# Patient Record
(demographics unavailable — no encounter records)

---

## 2025-01-08 NOTE — IMAGING
[de-identified] : On examination, patient has tenderness to ovation over the lower back on about the midline and over the lumbar paraspinal muscles, tenderness over the piriformis muscles. Patient has some discomfort when palpating over the sciatic notch. Patient has good range of motion to the right and left lower extremity. Patient has mild decreased motor strength to the right lower extremity when compared to the left.  Patient is able to do active straight leg raise to legs bilaterally. Patient has positive straight leg raise to the left and contralateral straight leg raise to the right. Negative logrolling. Antalgic gait.  Radiographs of the lumbar spine, 4 views were taken, negative for any acute fracture or dislocation, hardware appears to be in good position. X-ray of the left hip and pelvis, negative for any acute fracture or dislocation, 2 views were taken

## 2025-01-08 NOTE — DISCUSSION/SUMMARY
[de-identified] : Impression: Left hip pain possibly due to lumbar radiculopathy. Lumbar strain and lumbar radiculopathy.  Plan: We discussed medication, Medrol Dosepak, tramadol for breakthrough pain and muscle relaxant was sent to the pharmacy. Patient was advised to follow-up with pain management  Follow-up: As needed in our practice, patient was referred for pain management 2 weeks.

## 2025-01-08 NOTE — HISTORY OF PRESENT ILLNESS
[de-identified] : 66-year-old female here for an evaluation of injury sustained to the left lower extremity and lower back, patient stated she fell down in about December 30, 2024, patient states that she lost balance and she fell backwards flat on her back, since then she has been having significant pain on her back, left hip and the pain radiates down to her ankle. Patient has a history of lumbar surgery and ORIF to the right hip  Current medications are Eliquis, atorvastatin, sertraline, zinc, iron, baby aspirin.

## 2025-01-22 NOTE — ADDENDUM
[FreeTextEntry1] : Ephraim Mistry MD, [place]  of Urology, [facility] [employer] Director, [facility] [employer] [address] [place] [address], [address], [place], [place]  Phone: [phone] Fax: [phone]  	 Department of Urology [facility] [employer]  Consultation  VISIT DATE: 2025  REFFERING PROVIDER: n/a  PRIMARY CARE PROVIDER: Dr. Bernardo Fonseca  PROBLEM LIST:  CHIEF COMPLAINT: right ureteral stone  HISTORY OF PRESENT ILLNESS: The patient is a 66-year-old female with past medical history of CAD, PVD, carotid artery atherosclerosis (95% on right), multiple back surgeries, DVT (w/ IVC filter and still on Eliquis), duodenal switch  c/b SBO and SBR with end ileostomy in  and subsequent ex lap and repair of enterocutaneous fistula, vulvar cancer s/p resection 2/15/2023, HTN, and HLD presented to the ED on 2025 with AMS, EMILY, slurred speech and worsening gait, and was found to have a 8.5 x 7.1 x 14.1 mm proximal right ureteral stone now s/p right ureteral stent by Dr. Hart on 2025. She presents for follow up.  The patient reports that she has no symptoms. She denies flank pain, hematuria, dysuria, fevers. With her son that provided most of the medical history. With stroke had short term memory loss.  First stone identification:   Number of stone episodes: one Interventions: stent 2025 Family history of stone disease: no Fluid intake: 1 liter Salt intake: low Dark soda intake: none Animal protein intake: one of three Citrus intake: no Vitamin C intake: yes, unsure of the dose Oxalate intake: no common foods Overweight: yes  I had a lengthy discussion with this patient regarding stone preventive measures that are conservative in nature.  We talked about drinking roughly 3-4 L per day in order to make >2.5L of urine per day, limited salt intake to <2000mg sodium per day, minimizing phosphoric acid containing sodas (diamond), increasing citric acid containing fruits or beverages, minimizing animal protein < 2meals per day and less than 6-8 ounces, limiting the intake of high oxalate foods, < 1000 mg Vitamin C, normal calcium intake and moderate exercise and weight reduction.  The patient is overweight (BMI 26). I had a 15-minute discussion with the patient regarding dietary measures to lose weight. We talked about intermittent fasting, which involves limiting intake to 400 calories twice per week or skipping breakfast indefinitely. Alternatively, the patient can ensure a caloric deficit by estimating their metabolism and count calories with tools such as an Apple watch and a smartphone application that might have the additional benefit of tracking macronutrients to ensure a balanced diet.   PAST MEDICAL HISTORY: Per HPI  PAST SURGICAL HISTORY: Per HPI Right hip replacement  MEDICATIONS: Advair diskus ASA81 Atorvastatin Calcium 500 Eliquis Fe Lorazepam  Pregabalin Proventil Sertraline Valsartan VitB12 VitD3 VitC Zinc Pantoprazole   ALLERGIES: Morphine (itch)  FAMILY HISTORY: The patient has no family history of stone disease.  SOCIAL HISTORY: Previous smoker (20 years, 1 pack per day), no alcohol or drug use  REVIEW OF SYSTEMS: 12-point review of system was conducted including: systemic review, skin, head-eyes-ears-nose-throat, respiratory, cardiovascular, gastrointestinal, endocrine, neck, genitourinary, musculoskeletal, neuro-psychiatric, and hematologic. The review of systems was negative except as per HPI.  PHYSICAL EXAMINATION: Vital Signs: (see above)  BMI: 26 General: Awake, alert, well appearing, and in no acute distress. HEENT: Normocephalic, atraumatic. Lungs: Normal respiratory effort, bilateral chest rise. Heart: Regular rate and rhythm. Abdomen: Soft, nontender, and nondistended. : No costovertebral angle tenderness bilaterally  Extremities: warm and dry.  LABORATORY: 1/15/2025 WBC: 10.34 Hgb: 9.8 Plt: 166 K: 3.7 Cr: 2.5 (down from 3.1, BL unknown) Ca: 8.0   Urine Analysis: 1/10/2025: neg for infection, pH 5.5  Urine Culture and Sensitivities: 2025: no growth  IMAGIN2025 CTAP: IMPRESSION: Right-sided hydronephrosis/hydroureter with delayed nephrogram (contrast material administered on prior day). Secondary to approximately 9 x 8 x 13 mm calculus within proximal right ureter (4/79, ) Artifact from spinal hardware.  Dr. Mistry interpretation: Image evaluation for stone disease Imaging type and date: 2025 CTAP Left ureter: clear Left kidney: -	Upper pole: clear -	Interpole: clear -	Lower pole: clear  Right ureter: 8.5 x 7.1 x 14.1 mm (HU 1214), proximal ureter Right kidney: -	Upper pole: clear -	Interpole: clear -	Lower pole: clear  Skin to stone distance: -	Posterior: 11.2 cm -	45 de.0 cm -	Lateral: 14.9 -	Average: > 10 cm  Stent Length: 24 cm Additional notes: stone too dense and skin to stone too far for ESWL  ASSESSMENT:  The patient is a 66-year-old female with past medical history of CAD, PVD, carotid artery atherosclerosis, multiple back surgeries, DVT (w/ IVC filter and still on Eliquis), duodenal switch  c/b SBO and SBR with end ileostomy in  and subsequent ex lap and repair of enterocutaneous fistula, vulvar cancer s/p resection 2/15/2023, HTN, and HLD presented to the ED on 2025 with AMS, EMILY, slurred speech and worsening gait, and was found to have a 8.5 x 7.1 x 14.1 mm proximal right ureteral stone now s/p right ureteral stent by Dr. Hart on 2025. She presents for follow up.  I carefully reviewed the nature of the patient's stone disease in detail.  We reviewed the size and location of the stone burden as well as the relevant local anatomy.  We reviewed the different options for stone management in detail including active surveillance with and without metabolic evaluation, shockwave lithotripsy, ureteroscopy (URS) with laser lithotripsy and percutaneous nephrolithotomy.  After the lengthy discussion of all the risks and the benefits of the procedures, they wish to proceed with a URS.  I carefully reviewed all the risks of URS with the patient which include the risk of  injury to the ureter  with possible development of blockage later (ureteric stricture), ureteral injury requiring longer-term stenting, ureteral avulsion, failure be able to gain access and possible placement of a potential nephrostomy tube or just leaving a ureteral stent, residual stones which are quite common,  urinary tract infection, urinary sepsis which can be fatal, a risk of anesthetic (MI/stroke/death).  We reviewed that often we leave a ureteral stent after the procedure which can be quite uncomfortable for some patients (stent colic), as well as the critical need for stent removal and the risk of encrustation which can result in additional procedures or damage to the kidney.    We reviewed possible rare systemic events such as pulmonary embolism, DVT, bleeding requiring transfusion and possible radical nephrectomy.  After lengthy discussion of all the risks they wish to proceed, and we will schedule the procedure after a standard pre-operative evaluation including a sterile urine culture.    PLAN: -	I counseled the patient on diet and lifestyle modifications for general stone disease prevention  -	OR for right ureteroscopy, laser lithotripsy, and stent exchange with possible CVAC aspiration ureteroscopy -	UA and urine culture  -	Medical clearance -  -	Cardiology clearance -  -	If the patient is on anti-coagulation medication, it does NOT have to be held -	schedule post op stent removal one week following the procedure -	post operative medications ordered (antibiotic, tamsulosin, non-opioid pain medication)   For billing purposes  High level medical decision-making visit (CPT code 95978 or 88961) based on (two of three elements): -	I personally spent 45 minutes on the total care of this patient  Longitudinal care (CPT code ): -	The patient is diagnosed with stone disease, which is a chronic condition due to lifestyle choices or metabolic dysregulation, and requires longitudinal care to prevent disease progression, colic episodes, and complications.   Preventative medicine counseling (CPT code 65094): -	Lifestyle modifications and conservative management can be used to prevent future stone disease or augment medical therapies in the stone forming patient. Counseling of at least 15 minutes has been documented in this note.

## 2025-01-23 NOTE — HISTORY OF PRESENT ILLNESS
[FreeTextEntry1] : ORIGINAL PRESENTATION: This is a 66-year-old female who has chronic pain complaints regarding the lumbosacral region noted, which is the site of an L3-L4 and L4-L5 fusion, performed in 2009. Initially after surgery, she had done very well with 100% improvement until she sustained an injury in 2014 when she fell hurting her right hip. This aggravated her lower lumbar pain features. Surgery to the hip was done. She has recovered fairly well from this. In 2015, she developed an acute left-sided lumbar radicular pain feature as well as radiating pain into the right lower extremity, which has persisted since.   PATIENT PRESENTS FOR FOLLOW UP: This is a former Dr. Neal patient transferring her care to me. She was last seen in the office on 2/20/23 for complaints of lower back pain with radicular features into the lower extremity. She returns after a long delay for thoracic pain that was exacerbated by a recent fall. The pain currently radiates into the left lower extremity. It travels down the leg to the back of the knee. She is a s/p L3-L4 and L4-L5 fusion, performed in 2009. She denies any lower back pain today. I advised that imaging of the thoracic spine is needed for further review.   Of note, she was recently in the hospital for a kidney stone and had a stent placed. She suffered a stroke in 2023 which impacted her memory.

## 2025-01-23 NOTE — DATA REVIEWED
[FreeTextEntry1] : X-rays of the thoracic spine obtained in the office today were reviewed.  There is demonstration of postoperative changes and fusion from approximately T10-S1.  There is evidence of thoracic spondylosis.  MRI of the lumbar spine dated 2/18/2020 demonstrates Postoperative changes throughout the thoracolumbar spine.  There is substantial metallic susceptibility artifact obscuring assessment to a significant degree.  There is no evidence of high-grade central stenosis.  There is diffuse mild to moderate central canal narrowing secondary to bulge.  The neural foramina also are substantially obscured by artifact, there appears to be a moderate multilevel foraminal stenosis more pronounced towards the inferior levels because of bulge and facet hypertrophy.  Given the degree of artifact which is present on MRI, CT could prove beneficial for a further assessment.

## 2025-01-23 NOTE — ASSESSMENT
[FreeTextEntry1] : This is a 66-year-old female with complaints of thoracic pain. The pain started about a month ago after she took a fall. The pain travels into the left lower extremity. It goes down to the behind the knee. She denies any lower back pain at this time. We will obtain an x-ray of the thoracic spine for further review. In the interim, I will provide her with a prescription for physical therapy for symptom control. She will follow up in 3 weeks for reassessment. All this patients questions were answered and the conversation was understood well.  Thoracic x-ay was ordered to delineate spine pathology.  Physical therapy ofthe thoracic spine2-3 times a week for 4-8 weeks stressing a home exercise program of walking, shoulder griddle strengthening, swimming, elliptical , recumbent bike, Rajan chi and Yoga. Use things that heat like hot shower or icy heat before rehab, exercising and at the beginning of the day, and ice (ice in a bag never directly on the skin) after activity and at the end of the day.  Entered by Rox Long, acting as scribe for Dr. Varghese.  Documentation recorded by the scribe, in my presence, accurately reflects the service I personally performed, and the decisions made by me with my edits as appropriate.     Thank you for allowing me to assist in the management of this patient.     Best Regards,     Michelle Varghese M.D., FAAPMR     Diplomate, American Board of Physical Medicine and Rehabilitation Diplomate, American Board of Pain Medicine

## 2025-01-23 NOTE — PHYSICAL EXAM
[Normal Coordination] : normal coordination [Normal DTR UE/LE] : normal DTR UE/LE  [Normal Sensation] : normal sensation [Normal Mood and Affect] : normal mood and affect [Oriented] : oriented [Able to Communicate] : able to communicate [Well Developed] : well developed [Well Nourished] : well nourished [] : patient ambulates without assistive device

## 2025-02-13 NOTE — PHYSICAL EXAM
[Well Developed] : well developed [Well Nourished] : well nourished [No Acute Distress] : no acute distress [Normal Conjunctiva] : normal conjunctiva [Normal Venous Pressure] : normal venous pressure [Normal S1, S2] : normal S1, S2 [No Murmur] : no murmur [No Rub] : no rub [No Gallop] : no gallop [Clear Lung Fields] : clear lung fields [Good Air Entry] : good air entry [No Respiratory Distress] : no respiratory distress  [Soft] : abdomen soft [Non Tender] : non-tender [No Masses/organomegaly] : no masses/organomegaly [Normal Bowel Sounds] : normal bowel sounds [Normal Gait] : normal gait [No Edema] : no edema [No Cyanosis] : no cyanosis [No Clubbing] : no clubbing [No Varicosities] : no varicosities [No Rash] : no rash [No Skin Lesions] : no skin lesions [Moves all extremities] : moves all extremities [No Focal Deficits] : no focal deficits [Normal Speech] : normal speech [Alert and Oriented] : alert and oriented [Normal memory] : normal memory [de-identified] : L carotid bruit

## 2025-02-13 NOTE — ASSESSMENT
[FreeTextEntry1] : Ms. Sims is a 66-year-old woman with history of CAD, CVA x2 (2021), carotid artery disease (occluded R ICA, moderate L ICA), DVT s/p IVC filter, and ileostomy presenting for follow up.  Impression: (1) CAD, s/p elective PCI of the mLAD with residual distal LCx and OM2 disease, asymptomatic, stable (2) Carotid artery disease, occluded R ICA and moderate L ICA disease 2022, has been seen by Dr. Banda (3) CVA in 2021, thought 2/2 (2) (4) DVT after CVA, s/p IVC filter, per patient was 2/2 inactivity  Plan: - Discussed all of her prior cardiac workup in detail. She denies any active anginal complaints and she is able to achieve >4 METS without effort limiting dyspnea or chest discomfort. Her last TTE from 2022 showed normal systolic function with no significant valvular disease. She is considered increased risk for surgery based on an RCRI of 2 (10% risk of MACE). This may be underestimated due to her known moderate L ICA disease. She does not require further testing or intervention prior to  surgery. - Advised that she follow up with Dr. Banda for further management of her ICA disease. A carotid doppler was ordered. - Advised she continue long term follow up with Dr. Marshall. She will schedule a visit with him for follow up after surgery. - Continue aspirin without interruption. May stop apixaban 2-3 days prior to surgery. Advised she follow up with vascular and/or hematology to determine when apixaban may be discontinued. From a strictly coronary/carotid perspective she only needs to be on aspirin. - Repeat labs

## 2025-02-13 NOTE — HISTORY OF PRESENT ILLNESS
[FreeTextEntry1] : Ms. Sims is a 66-year-old woman with history of CAD, CVA x2 (2021), carotid artery disease (occluded R ICA, moderate L ICA), DVT s/p IVC filter, and ileostomy presenting for follow up.  She sees Dr. Marshall and was last seen in office 2 years ago. She presents today for preoperative risk assessment prior to kidney stone surgery. She could not remember the name of her cardiologist and was scheduled to see me in the interim.  Overall feels well - denies any exertional chest discomfort or dyspnea. Climbs >2 flights of stairs several times a day without effort limiting dyspnea or chest pain.  She has not seen vascular surgery or her cardiologist in 2 years.  CCTa 3/2022: CAC 1588, RCA and LAD disease, FFR+ LAD, OM1, and dRCA LHC 3/2022: s/p PCI of the mid-LAD; mLAD 90%, dLCx 70-80%, OM2 70-80%, mRCA 30% TTE 3/2022: EF 68%, G1DD, trace MR, trace TR, sclerotic Ao LE arterial doppler 2022: diffuse mild disease Carotid doppler 2/2022: prox L ICA 50-69%, R ICA total occlusion  Labs: - , LDL 51, HDL 43, TG 83 - Cr 0.62, K 3.5  Noted Meds: - ASA 81mg - Apixaban 2.5mg BID - Valsartan 160mg - Atorva 10mg - Advair - Tamsulosin - Sertraline

## 2025-03-13 NOTE — ASSESSMENT
[FreeTextEntry1] : 67 y/o female with right ICA stenosis with string sign, not amenable to revascularization, h/o CVA with residual weakness on left. She has h/o DVT and IVC filter that was placed in June 2020 and is on Eliquis.  CTA neck from January 2025 and carotid duplex February 2025, venous duplex done in January 2025 showed no DVT bilaterally in the LE.  No surgical intervention needed. F/u in six months for carotid duplex.    I, Dr. Tanner Banda, personally performed the evaluation and management (E/M) services for this established patient who presents today with (a) new problem(s)/exacerbation of (an) existing condition(s). That E/M includes conducting the clinically appropriate interval history &/or exam, assessing all new/exacerbated conditions, and establishing a new plan of care. Today, my TWYLA, Debbie Tompkins PA-C, was here to observe my evaluation and management service for this new problem/exacerbated condition and follow the plan of care established by me going forward.

## 2025-03-13 NOTE — HISTORY OF PRESENT ILLNESS
[FreeTextEntry1] : 65 y/o female with right ICA stenosis with string sign, not amenable to revascularization, h/o CVA with residual weakness on left. She has h/o DVT and IVC filter that was placed in June 2020 and is on Eliquis.  CTA neck from January 2025 and carotid duplex February 2025, venous duplex done in January 2025 showed no DVT bilaterally in the LE.

## 2025-04-23 NOTE — ASSESSMENT
[FreeTextEntry1] : Ephraim Mistry MD, [place]  of U[facility], [facility] [employer] Director, [facility] [address] [place] [address], [address], [place], [place]  Phone: [phone] Fax: [phone]  	 Department of Urology [facility]  Consultation  VISIT DATE: 2025  REFFERING PROVIDER: n/a  PRIMARY CARE PROVIDER: Dr. Bernardo Fonseca  PROBLEM LIST: -	History of stone disease z87.442 -	BMI 20-29 z68.2 -	Overweight (BMI 25-29) E66.3  CHIEF COMPLAINT: right ureteral stone  HISTORY OF PRESENT ILLNESS: The patient is a 66-year-old female with past medical history of CAD, PVD, carotid artery atherosclerosis (95% on right), multiple back surgeries, DVT (w/ IVC filter and still on Eliquis), duodenal switch  c/b SBO and SBR with end ileostomy in  and subsequent ex lap and repair of enterocutaneous fistula, vulvar cancer s/p resection 2/15/2023, HTN, and HLD presented to the ED on 2025 with AMS, EMILY, slurred speech and worsening gait, and was found to have a 8.5 x 7.1 x 14.1 mm proximal right ureteral stone now s/p right ureteral stent by Dr. Hart on 2025, right aspiration ureteroscopy by me on 2025, and stent removal on 3/12/2025. She is Grade A stone free and presents for follow up.   The patient reports that she has no symptoms. She denies flank pain, hematuria, dysuria, fevers. With her son that provided most of the medical history. With stroke had short term memory loss.  First stone identification:  2025 Number of stone episodes: one Interventions: stent 2025 Family history of stone disease: no Fluid intake: 1 liter Salt intake: low Dark soda intake: none Animal protein intake: one of three Citrus intake: no Vitamin C intake: yes, unsure of the dose Oxalate intake: no common foods Overweight: yes  I had a lengthy discussion with this patient regarding stone preventive measures that are conservative in nature.  We talked about drinking roughly 3-4 L per day in order to make >2.5L of urine per day, limited salt intake to <2000mg sodium per day, minimizing phosphoric acid containing sodas (diamond), increasing citric acid containing fruits or beverages, minimizing animal protein < 2meals per day and less than 6-8 ounces, limiting the intake of high oxalate foods, < 1000 mg Vitamin C, normal calcium intake and moderate exercise and weight reduction.  The patient is overweight (BMI 26). I had a 15-minute discussion with the patient regarding dietary measures to lose weight. We talked about intermittent fasting, which involves limiting intake to 400 calories twice per week or skipping breakfast indefinitely. Alternatively, the patient can ensure a caloric deficit by estimating their metabolism and count calories with tools such as an Apple watch and a smartphone application that might have the additional benefit of tracking macronutrients to ensure a balanced diet.   PAST MEDICAL HISTORY: Per HPI  PAST SURGICAL HISTORY: Per HPI Right hip replacement  MEDICATIONS: Advair diskus ASA81 Atorvastatin Calcium 500 Eliquis Fe Lorazepam  Pregabalin Proventil Sertraline Valsartan VitB12 VitD3 VitC Zinc Pantoprazole   ALLERGIES: Morphine (itch)  FAMILY HISTORY: The patient has no family history of stone disease.  SOCIAL HISTORY: Previous smoker (20 years, 1 pack per day), no alcohol or drug use  REVIEW OF SYSTEMS: 12-point review of system was conducted including: systemic review, skin, head-eyes-ears-nose-throat, respiratory, cardiovascular, gastrointestinal, endocrine, neck, genitourinary, musculoskeletal, neuro-psychiatric, and hematologic. The review of systems was negative except as per HPI.  PHYSICAL EXAMINATION: Vital Signs: (see above)  BMI: 26 General: Awake, alert, well appearing, and in no acute distress. HEENT: Normocephalic, atraumatic. Lungs: Normal respiratory effort, bilateral chest rise. Heart: Regular rate and rhythm. Abdomen: Soft, nontender, and nondistended. : No costovertebral angle tenderness bilaterally  Extremities: warm and dry.  LABORATORY: 1/15/2025 WBC: 10.34 Hgb: 9.8 Plt: 166 K: 3.7 Cr: 2.5 (down from 3.1, BL unknown) Ca: 8.0   Urine Analysis: 1/10/2025: neg for infection, pH 5.5  Urine Culture and Sensitivities: 2025: no growth  [id number] CTAP: IMPRESSION: Right-sided hydronephrosis/hydroureter with delayed nephrogram (contrast material administered on prior day). Secondary to approximately 9 x 8 x 13 mm calculus within proximal right ureter (, ) Artifact from spinal hardware.  Dr. Mistry interpretation: Image evaluation for stone disease Imaging type and date: 2025 CTAP No stones in kidneys or ureters bilaterally.   Imaging type and date: 2025 CTAP Left ureter: clear Left kidney: -	Upper pole: clear -	Interpole: clear -	Lower pole: clear  Right ureter: 8.5 x 7.1 x 14.1 mm (HU 1214), proximal ureter Right kidney: -	Upper pole: clear -	Interpole: clear -	Lower pole: clear  Skin to stone distance: -	Posterior: 11.2 cm -	45 de.0 cm -	Lateral: 14.9 -	Average: > 10 cm  Stent Length: 24 cm Additional notes: stone too dense and skin to stone too far for ESWL  ASSESSMENT:  The patient is a 66-year-old female with past medical history of CAD, PVD, carotid artery atherosclerosis (95% on right), multiple back surgeries, DVT (w/ IVC filter and still on Eliquis), duodenal switch  c/b SBO and SBR with end ileostomy in  and subsequent ex lap and repair of enterocutaneous fistula, vulvar cancer s/p resection 2/15/2023, HTN, and HLD presented to the ED on 2025 with AMS, EMILY, slurred speech and worsening gait, and was found to have a 8.5 x 7.1 x 14.1 mm proximal right ureteral stone now s/p right ureteral stent by Dr. Hart on 2025, right aspiration ureteroscopy by me on 2025, and stent removal on 3/12/2025. She is Grade A stone free and presents for follow up.   PLAN: -	I counseled the patient on diet and lifestyle modifications for general stone disease prevention  -	The patient does not want additional follow up and tests for stone preventions. This is reasonable since it is her first stone.  -	RTC PRN  For billing purposes  High level visit (CPT code 16182 or 95744): -	I personally spent 40 minutes on total care of this patient excluding separately reported services and / or teaching.  Longitudinal care (CPT code ): -	The patient is diagnosed with stone disease, which is a chronic condition due to lifestyle choices or metabolic dysregulation, and requires longitudinal care to prevent disease progression, colic episodes, and complications.   Preventative medicine counseling (CPT code 24801): -	Lifestyle modifications and conservative management can be used to prevent future stone disease or augment medical therapies in the stone forming patient. Counseling of at least 15 minutes has been documented in this note.

## 2025-05-21 NOTE — PHYSICAL EXAM
[Abnormal Gait] : abnormal gait [Normal] : alert and oriented, normal memory [de-identified] : 4/5 muscle strength in left side

## 2025-05-21 NOTE — CARDIOLOGY SUMMARY
[de-identified] : 1- Sinus bradycardia NS T wave change  2- NSR normal ECG .  3- NSR Normal ECG 4- NSR Normal ECG .   5- NSR R wave regression V3-V4  [de-identified] : 2-2022 Normal LV systolic function Trace MR trace TR . Grade I diastolic dysfunction  2- EF 58% Ascending aorta 3.7 cm  [de-identified] : CT Angio Heart coronary:03/4/2022\par  Ca score 1588, significant CAD in LAD and RCA\par   LM focal calcified plaque w/o significant stenosis.\par  LAD Severe stenosis mLAD and dLAD\par  LCx: diffuse calcifications, mild stenosis. Extensive calcifications in OM\par  RCA: severe stenosis in mRCA [de-identified] : 3-2022 90% mid LAD . 70% LCX ostial OM branch  S/P BAY in LAD  [de-identified] : 05/15/2022: US duplex arterial LE: diffuse atherosclerotic plaque through femoropopliteal and tibial arteries with no evidence of hemodinamically significant disease\par  \par  02/17/2022: Carotid duplex:\par   DARRYL total occlusion\par  LICA 50-69% stenosis\par   [de-identified] : CT FFR 03/07/2022: hemodynamic significant lesions in LAD (0.61 in mLAD and 0.60 in dLAD), OM1 (0.60) and dRCA(0.66)

## 2025-05-21 NOTE — ASSESSMENT
[FreeTextEntry1] : . The patient has had a DVT and has an IVC filter . She has a right carotid stenosis which is severe and in the intracranial portion of the carotid . She is followed by vascular. She has had a second small CVA in the spring of 2021. Said to have had a bubble study and according to the patient does not have a PFO . She was not given a monitor and the side of the CVA was the side of the severe right carotid stenosis . The patient has not had a recent cardiac work up . She has hyperlipidemia . She was not worked up for hypercoagulable state . She has severe right ICA stenosis and some degree of left carotid stenosis .  The patient had cardiac cath which showed a 90% stenosis of the mid LAD . 70% in the LCX and 30% in the RCA . The patient had a BAY placed in the LAD .  The patient had gone for resction of her Vulvar . She had CA and LN biopsy . The patient had no complications. She is now finally going for her colostomy reversal  The patient has a RCRI of 10% for 30 day risk if MI Death . The patient should be considered an elevated risk undergoing an intermediate risk procedure. The patient is currently stable fro the procedure .  The patient has had no chest pain or further neurological symptoms . Claims to have some short term memory loss .  5- The patient has not been seen for 2 years . She has had no chest pain or SOB . She has been seen by vascular who is following her carotid disease ( unilateral occlusion ) . The patient has not had blood work done . Echo showed normal LV systolic function recently . There was mild enlargement of the thoracic aorta .

## 2025-05-21 NOTE — REASON FOR VISIT
[Symptom and Test Evaluation] : symptom and test evaluation [Hyperlipidemia] : hyperlipidemia [Hypertension] : hypertension [Carotid, Aortic and Peripheral Vascular Disease] : carotid, aortic and peripheral vascular disease [FreeTextEntry3] : Dr. Fonseca

## 2025-05-21 NOTE — HISTORY OF PRESENT ILLNESS
[FreeTextEntry1] : 63 y.o. female with PMH CVA x2 ( last in Spring  of 2021), DVT (IVC filter is in place), hypercoagulable state, ileostomy post bowel obstruction, carotid stenosis: severe Jayne, hyperlipidemia, was scheduled for ileostomy reversal. Cardiac clearance work up was significant for CTA and CT FFR c/w severe stenosis in LAD, RCA and OM1. Patient had cath   She was evaluated by vascular medicine and confirmed to have patent femoral arteries accessible for cardiac catheterization. Patient underwent LHC on 03/21/2022: 2V CAD, BAY in LAD and recommendations for medical management for 70%  LCx stenosis. RCA had mild diseae .  The patient is now on ASA and DOAC .  The patient had  Vulvar CA . She needed local excision and LN biopsy without complication . The patient had reversal of her colostomy . She has not had chest pain or SOB .  The patient has not been seen for 2 years and had seen Dr. Todd for clearance prior to kidney stone procedure in February . The patient has not had chest pain or SOB and overall feels well